# Patient Record
Sex: FEMALE | Race: BLACK OR AFRICAN AMERICAN | Employment: UNEMPLOYED | URBAN - METROPOLITAN AREA
[De-identification: names, ages, dates, MRNs, and addresses within clinical notes are randomized per-mention and may not be internally consistent; named-entity substitution may affect disease eponyms.]

---

## 2018-03-12 ENCOUNTER — HOSPITAL ENCOUNTER (OUTPATIENT)
Dept: OTHER | Age: 78
Discharge: HOME | End: 2018-03-31
Attending: ORTHOPAEDIC SURGERY | Admitting: ORTHOPAEDIC SURGERY

## 2018-03-12 ASSESSMENT — PAIN DESCRIPTION - ORIENTATION: ORIENTATION: RIGHT;LEFT

## 2018-03-12 ASSESSMENT — PAIN DESCRIPTION - DESCRIPTORS: DESCRIPTORS: ACHING;CONSTANT;SHOOTING;SHARP

## 2018-03-12 ASSESSMENT — PAIN DESCRIPTION - LOCATION: LOCATION: KNEE

## 2018-03-12 ASSESSMENT — PAIN DESCRIPTION - PROGRESSION: CLINICAL_PROGRESSION: GRADUALLY WORSENING

## 2018-03-22 ENCOUNTER — HOSPITAL ENCOUNTER (OUTPATIENT)
Dept: PHYSICAL THERAPY | Age: 78
Discharge: HOME | End: 2018-03-23
Admitting: ORTHOPAEDIC SURGERY

## 2018-03-22 NOTE — FLOWSHEET NOTE
10 Balance: Hip Abduction 10 SLS    Hip Circles  Tandem stance    TA set  NBOS eyes open 30 sec x 2   Glut Set  NBOS eyes closed    Hip Extension  Hand to Opposite Knee    Hip Adduction    Box Step     Hip IR   Noodle Stance     Hip ER  Stop/Go Gait    Fig 8's  Switch Gait      Placing foot on step 30 sec x 1  R/L         Seated: bilat Functional:    Ankle Pumps 10 Step up forward    Ankle circles 10 Step up lateral    Knee flex & ext 10 Step down    Hip Abd & Adduction 10 [place] squats    Bicycle  10 Crate Lifts    Add Set with ball 10 Lunges forward    LX stab with med ball throws  Lunges lateral    Ankle INV  Lunges retro    Ankle EV  Lower ab curl with noodle      Upper ab curl with ball      Med ball straight lifts      Med ball diagonal lifts      Hydrorider          Noodle:      Leg Press  Deep Water:    Noodle hang at wall  Jog    Noodle hang deep water  Jumping Jacks    Noodle Bicycle  Heel to toe      Hand to opposite knee      Cross country skier      Rocking Horse    Verbal cues for proper posture, exercise technique and exercise without increase pain. Timed Code Treatment Minutes:  30    Total Treatment Minutes:  30    Treatment/Activity Tolerance:  [x] Patient tolerated treatment well [] Patient limited by fatique  [] Patient limited by pain  [] Patient limited by other medical complications  [] Other:     Pain after aquatics: Left knee 4/10, Right knee 0/10    Patient Education:  Discussed with pt importance of exercises without increase pain and with proper posture. Pt verbalized understanding. Plan:   [x] Continue per plan of care [] Alter current plan (see comments)  [] Plan of care initiated [] Hold pending MD visit [] Discharge    Plan for Next Session:  Gradually increase gait and exercise with an emphasis on posture, proper exercise tech, and no increase pain. Therapist will educate patient on lumbopelvic stabilization with exercise and ADL's.       Add:  Inc seated exer x 15

## 2018-03-27 ENCOUNTER — HOSPITAL ENCOUNTER (OUTPATIENT)
Dept: PHYSICAL THERAPY | Age: 78
Discharge: HOME | End: 2018-03-28
Admitting: ORTHOPAEDIC SURGERY

## 2018-03-29 ENCOUNTER — HOSPITAL ENCOUNTER (OUTPATIENT)
Dept: PHYSICAL THERAPY | Age: 78
Discharge: HOME | End: 2018-03-30
Admitting: ORTHOPAEDIC SURGERY

## 2018-04-01 ENCOUNTER — HOSPITAL ENCOUNTER (OUTPATIENT)
Dept: OTHER | Age: 78
Discharge: HOME | End: 2018-04-30
Attending: ORTHOPAEDIC SURGERY | Admitting: ORTHOPAEDIC SURGERY

## 2018-04-03 ENCOUNTER — HOSPITAL ENCOUNTER (OUTPATIENT)
Dept: PHYSICAL THERAPY | Age: 78
Discharge: HOME | End: 2018-04-04
Admitting: ORTHOPAEDIC SURGERY

## 2018-04-05 ENCOUNTER — HOSPITAL ENCOUNTER (OUTPATIENT)
Dept: PHYSICAL THERAPY | Age: 78
Discharge: HOME | End: 2018-04-06
Admitting: ORTHOPAEDIC SURGERY

## 2018-04-10 ENCOUNTER — HOSPITAL ENCOUNTER (OUTPATIENT)
Dept: PHYSICAL THERAPY | Age: 78
Discharge: HOME | End: 2018-04-11
Admitting: ORTHOPAEDIC SURGERY

## 2018-04-10 NOTE — FLOWSHEET NOTE
R/L   Hip Circles 10 x 2 Tandem stance    TA set  NBOS eyes open    Glut Set 10 NBOS eyes closed    Hip Extension  Hand to Opposite Knee    Hip Adduction    Box Step     Hip IR   Noodle Stance     Hip ER  Stop/Go Gait    Fig 8's  Switch Gait      Placing foot on step      Alt step taps    Seated: bilat Functional:    Ankle Pumps 30 Step up forward 10 R/L without c/o   Ankle circles 10 Step up lateral    Knee flex & ext 30 Step down    Hip Abd & Adduction 30 [place] squats    Bicycle  30 Crate Lifts    Add Set with ball 10 Lunges forward    Knee ext with ball 10 R/L Lunges lateral    Ankle INV  Lunges retro    Ankle EV  Lower ab curl with noodle      Upper ab curl with ball      Med ball straight lifts      Med ball diagonal lifts      Hydrorider          Noodle:      Leg Press 20 R/L Deep Water:    Noodle hang at wall  Jog    Noodle hang deep water  Jumping Jacks    Noodle Bicycle  Heel to toe      Hand to opposite knee      Cross country skier      Rocking Horse    Verbal cues for proper posture, exercise technique and exercise without increase pain. Timed Code Treatment Minutes:  50    Total Treatment Minutes:  50    Treatment/Activity Tolerance:  [x] Patient tolerated treatment well [] Patient limited by fatique  [] Patient limited by pain  [] Patient limited by other medical complications  [] Other:     Pain after aquatics: 0/10 both knees    Patient Education:    Plan:   [x] Continue per plan of care [] Alter current plan (see comments)  [] Plan of care initiated [] Hold pending MD visit [] Discharge    Plan for Next Session:    Add: inc step ups fwd, inc forward gt as tolerated.     Electronically signed by: Alba Merida, [id number]

## 2018-04-10 NOTE — PROGRESS NOTES
Outpatient Physical Therapy  [x] [facility]    Phone: [phone]   Fax: [phone]   [] [facility]  Phone: [phone]   Fax: [phone]  [] [facility]              Phone: [phone]   Fax: [phone]     Physical Therapy Progress/Discharge Note  Date: 4/10/2018        Patient Name:  Buck Rosado    :  1940  MRN: [id number]    Diagnosis:  B knee pain  Treatment Diagnosis:  Decreased strength, mobility    Insurance/Certification information:  [address] of [facility]  Referring Physician:  Dayton Perez MD    Visit# / total visits:    Pain level: 5/10     G-Code (if applicable):      Date G-Code Applied:      PT G-Codes  Functional Assessment Tool Used: LEFS  Score: 80 = 21.3% confidence, 78.7% disability  Functional Limitation: Mobility: Walking and moving around  Mobility: Walking and Moving Around Current Status (): At least 60 percent but less than 80 percent impaired, limited or restricted  Mobility: Walking and Moving Around Goal Status (): At least 1 percent but less than 20 percent impaired, limited or restricted     Time Period for Report:  1 month  Cancels/No-shows to date: 1      Plan of Care/Treatment to date:  [x] Therapeutic Exercise    [] Modalities:  [x] Therapeutic Activity     [] Ultrasound  [] Electrical Stimulation  [] Gait Training      [] Cervical Traction    [] Lumbar Traction  [] Neuromuscular Re-education  [] Cold/hotpack [] Iontophoresis  [x] Instruction in HEP      Other:  [] Manual Therapy       []    [x] Aquatic Therapy       []                    ? Significant Findings At Last Visit/Comments:    Subjective:  Currently tolerating 45 mins of aquatic exercises without increased knee pain. Patient reports 50% improvement with pain overall. Patient notes she is up doing more around the house than prior to PT. Patient reports walking tolerance is about 5 mins before knees start hurting.        Objective:  · Observation: Updated

## 2018-04-12 ENCOUNTER — HOSPITAL ENCOUNTER (OUTPATIENT)
Dept: PHYSICAL THERAPY | Age: 78
Discharge: HOME | End: 2018-04-13
Admitting: ORTHOPAEDIC SURGERY

## 2018-04-17 ENCOUNTER — HOSPITAL ENCOUNTER (OUTPATIENT)
Dept: PHYSICAL THERAPY | Age: 78
Discharge: HOME | End: 2018-04-18
Admitting: ORTHOPAEDIC SURGERY

## 2018-04-17 NOTE — FLOWSHEET NOTE
Physical Therapy Aquatic Flow Sheet   Date:  2018    Patient Name:  Manjula Brasher    :  1940    Restrictions/Precautions:  Fall Risk (low: pt denies any history of falls.)  Pertinent Medical History: asthma, HTN    Diagnosis:  B knee pain  Treatment Diagnosis:  Decreased strength, mobility    Insurance/Certification information: [address] of [facility]    Referring Physician: Nikky Shipley MD  Plan of care signed (Y/N):      Visit# / total visits:    Pain level: 10 both knees    G-Code (if applicable):      Date G-Code Applied:        Progress Note: []  Yes  [x]  No  Next due by: Visit #10:      Subjective: I can walk longer using my cane without my knees hurting. Objective:  Observation:  See eval  Test measurements:      Key  B= Belt DB= Dumbells T= Theratube   G=Gloves N= Noodles W= Weights   P= Paddles WW=Water Walker K= Kickboard        Transfers:   steps with bilat handrails, step-to-step      % Immersion: 75%            Ambulation:   Exercises UE:      Forwards 5+2  Shoulder Shrugs     Lateral 3 Shoulder circles     Marching    Scapular Retraction      Retro   Rolling      Cariocas  Push Downs      IR/ER      Punching    Stretching: bilat Rowing    Gastroc/Soleus 20 sec x 3   Elbow Flex/Ext    Hamstring  20 sec x 3 Shldr Flex/Ext     Knee flex stretch   Shldr Abd/Add    Piriformis   Horiz Abd/Add     Hip flexor    Arm Circles     SKTC   PNF Diagonals    DKTC  UE oscillations f/b, s/s    ITB   Wall Push-ups    Quad  Figure 8's    Mid back   Buoy pull/push downs    UT  Tband rows    Rhom  Tband lats    Post Shoulder  Lumbar Rot w/ paddles    Pec   Small ball pull downs                   diagonals             Cervical:       AROM Flex       AROM Extension     LEs exercises:  bilat AROM Side Bending    Marching  10 AROM Rotation    Heel Raises  10 Chin tucks    Toe Raises  10 Chin nods     Squats  10      Hamstring Curls  10      Hip Flexion  10 Balance:      Hip Abduction 10

## 2018-04-19 ENCOUNTER — HOSPITAL ENCOUNTER (OUTPATIENT)
Dept: PHYSICAL THERAPY | Age: 78
Discharge: HOME | End: 2018-04-20
Admitting: ORTHOPAEDIC SURGERY

## 2018-04-19 NOTE — FLOWSHEET NOTE
Physical Therapy Aquatic Flow Sheet   Date:  2018    Patient Name:  Zach Norris    :  1940    Restrictions/Precautions:  Fall Risk (low: pt denies any history of falls.)  Pertinent Medical History: asthma, HTN    Diagnosis:  B knee pain  Treatment Diagnosis:  Decreased strength, mobility    Insurance/Certification information: [address]ah [address] of [facility]    Referring Physician: Shaista Escalera MD  Plan of care signed (Y/N):      Visit# / total visits:    Pain level: 1/10 both knees    G-Code (if applicable):      Date G-Code Applied:        Progress Note: []  Yes  [x]  No  Next due by: Visit #10:      Subjective: I was able to walk 2 blocks yesterday using my cane without any increase in pain or more pain today. My knees are very good.     Objective:  Observation:  See eval  Test measurements:      Key  B= Belt DB= Dumbells T= Theratube   G=Gloves N= Noodles W= Weights   P= Paddles WW=Water Walker K= Kickboard        Transfers:   steps with bilat handrails, step-to-step      % Immersion: 75%            Ambulation:   Exercises UE:      Forwards 5+2  Shoulder Shrugs     Lateral 4 Shoulder circles     Marching    Scapular Retraction      Retro   Rolling      Cariocas  Push Downs      IR/ER      Punching    Stretching: bilat Rowing    Gastroc/Soleus 20 sec x 3   Elbow Flex/Ext    Hamstring  20 sec x 3 Shldr Flex/Ext     Knee flex stretch   Shldr Abd/Add    Piriformis   Horiz Abd/Add     Hip flexor    Arm Circles     SKTC   PNF Diagonals    DKTC  UE oscillations f/b, s/s    ITB   Wall Push-ups    Quad  Figure 8's    Mid back   Buoy pull/push downs    UT  Tband rows    Rhom  Tband lats    Post Shoulder  Lumbar Rot w/ paddles    Pec   Small ball pull downs                   diagonals             Cervical:       AROM Flex       AROM Extension     LEs exercises:  bilat AROM Side Bending    Marching  10 AROM Rotation    Heel Raises  10 Chin tucks    Toe Raises  10 Chin nods     Squats  10

## 2018-04-24 ENCOUNTER — HOSPITAL ENCOUNTER (OUTPATIENT)
Dept: PHYSICAL THERAPY | Age: 78
Discharge: HOME | End: 2018-04-25
Admitting: ORTHOPAEDIC SURGERY

## 2018-04-26 ENCOUNTER — HOSPITAL ENCOUNTER (OUTPATIENT)
Dept: PHYSICAL THERAPY | Age: 78
Discharge: HOME | End: 2018-04-27
Admitting: ORTHOPAEDIC SURGERY

## 2018-05-01 ENCOUNTER — HOSPITAL ENCOUNTER (OUTPATIENT)
Dept: PHYSICAL THERAPY | Age: 78
Discharge: HOME | End: 2018-05-02
Admitting: ORTHOPAEDIC SURGERY

## 2018-05-01 ENCOUNTER — HOSPITAL ENCOUNTER (OUTPATIENT)
Dept: OTHER | Age: 78
Discharge: HOME | End: 2018-05-31
Attending: ORTHOPAEDIC SURGERY | Admitting: ORTHOPAEDIC SURGERY

## 2018-05-08 ENCOUNTER — HOSPITAL ENCOUNTER (OUTPATIENT)
Dept: PHYSICAL THERAPY | Age: 78
Discharge: HOME | End: 2018-05-09
Admitting: ORTHOPAEDIC SURGERY

## 2018-05-10 ENCOUNTER — HOSPITAL ENCOUNTER (OUTPATIENT)
Dept: PHYSICAL THERAPY | Age: 78
Discharge: HOME | End: 2018-05-11
Admitting: ORTHOPAEDIC SURGERY

## 2018-05-10 NOTE — FLOWSHEET NOTE
Physical Therapy  Cancellation/No-show Note  Patient Name:  Manjula Brasher  :  1940   Date:  5/10/2018  Cancelled visits to date: 3  No-shows to date: 0    For today's appointment patient:  [x]  Cancelled  []  Rescheduled appointment  []  No-show     Reason given by patient:  [x]  Patient ill  []  Conflicting appointment  []  No transportation    []  Conflict with work  []  No reason given  []  Other:     Comments:      Electronically signed by:  John Ruby

## 2018-05-15 ENCOUNTER — HOSPITAL ENCOUNTER (OUTPATIENT)
Dept: PHYSICAL THERAPY | Age: 78
Discharge: HOME | End: 2018-05-16
Admitting: ORTHOPAEDIC SURGERY

## 2018-06-01 ENCOUNTER — HOSPITAL ENCOUNTER (OUTPATIENT)
Dept: OTHER | Age: 78
Discharge: HOME | End: 2018-06-26
Attending: ORTHOPAEDIC SURGERY | Admitting: ORTHOPAEDIC SURGERY

## 2018-12-23 ENCOUNTER — APPOINTMENT (OUTPATIENT)
Dept: GENERAL RADIOLOGY | Age: 78
End: 2018-12-23
Payer: MEDICARE

## 2018-12-23 ENCOUNTER — HOSPITAL ENCOUNTER (EMERGENCY)
Age: 78
Discharge: HOME | End: 2018-12-23
Payer: MEDICARE

## 2018-12-23 VITALS
SYSTOLIC BLOOD PRESSURE: 142 MMHG | HEART RATE: 76 BPM | HEIGHT: 64 IN | TEMPERATURE: 98.4 F | RESPIRATION RATE: 15 BRPM | DIASTOLIC BLOOD PRESSURE: 76 MMHG | BODY MASS INDEX: 28.68 KG/M2 | OXYGEN SATURATION: 96 % | WEIGHT: 167.99 LBS

## 2018-12-23 DIAGNOSIS — M10.9 ACUTE GOUT OF RIGHT HAND, UNSPECIFIED CAUSE: Primary | ICD-10-CM

## 2018-12-23 DIAGNOSIS — M18.11 DEGENERATIVE ARTHRITIS OF THUMB, RIGHT: ICD-10-CM

## 2018-12-23 LAB
A/G RATIO: 1.2 (ref 1.1–2.2)
ALBUMIN SERPL-MCNC: 4 G/DL (ref 3.4–5)
ALP BLD-CCNC: 83 U/L (ref 40–129)
ALT SERPL-CCNC: 13 U/L (ref 10–40)
ANION GAP SERPL CALCULATED.3IONS-SCNC: 15 MMOL/L (ref 3–16)
AST SERPL-CCNC: 19 U/L (ref 15–37)
BASOPHILS ABSOLUTE: 0.1 K/UL (ref 0–0.2)
BASOPHILS RELATIVE PERCENT: 0.6 %
BILIRUB SERPL-MCNC: 0.4 MG/DL (ref 0–1)
BUN BLDV-MCNC: 13 MG/DL (ref 7–20)
CALCIUM SERPL-MCNC: 9.5 MG/DL (ref 8.3–10.6)
CHLORIDE BLD-SCNC: 101 MMOL/L (ref 99–110)
CO2: 24 MMOL/L (ref 21–32)
CREAT SERPL-MCNC: 0.6 MG/DL (ref 0.6–1.2)
EOSINOPHILS ABSOLUTE: 0.1 K/UL (ref 0–0.6)
EOSINOPHILS RELATIVE PERCENT: 1.5 %
GFR AFRICAN AMERICAN: >60
GFR NON-AFRICAN AMERICAN: >60
GLOBULIN: 3.4 G/DL
GLUCOSE BLD-MCNC: 96 MG/DL (ref 70–99)
HCT VFR BLD CALC: 40.3 % (ref 36–48)
HEMOGLOBIN: 13.6 G/DL (ref 12–16)
LYMPHOCYTES ABSOLUTE: 3.5 K/UL (ref 1–5.1)
LYMPHOCYTES RELATIVE PERCENT: 36.6 %
MCH RBC QN AUTO: 29.6 PG (ref 26–34)
MCHC RBC AUTO-ENTMCNC: 33.8 G/DL (ref 31–36)
MCV RBC AUTO: 87.5 FL (ref 80–100)
MONOCYTES ABSOLUTE: 0.6 K/UL (ref 0–1.3)
MONOCYTES RELATIVE PERCENT: 6.8 %
NEUTROPHILS ABSOLUTE: 5.2 K/UL (ref 1.7–7.7)
NEUTROPHILS RELATIVE PERCENT: 54.5 %
PDW BLD-RTO: 14.8 % (ref 12.4–15.4)
PLATELET # BLD: 318 K/UL (ref 135–450)
PMV BLD AUTO: 7.4 FL (ref 5–10.5)
POTASSIUM SERPL-SCNC: 3 MMOL/L (ref 3.5–5.1)
RBC # BLD: 4.6 M/UL (ref 4–5.2)
RHEUMATOID FACTOR: <10 IU/ML
SEDIMENTATION RATE, ERYTHROCYTE: 18 MM/HR (ref 0–30)
SODIUM BLD-SCNC: 140 MMOL/L (ref 136–145)
TOTAL PROTEIN: 7.4 G/DL (ref 6.4–8.2)
URIC ACID, SERUM: 6.4 MG/DL (ref 2.6–6)
WBC # BLD: 9.5 K/UL (ref 4–11)

## 2018-12-23 PROCEDURE — 73140 X-RAY EXAM OF FINGER(S): CPT

## 2018-12-23 PROCEDURE — 6370000000 HC RX 637 (ALT 250 FOR IP): Performed by: PHYSICIAN ASSISTANT

## 2018-12-23 PROCEDURE — 80053 COMPREHEN METABOLIC PANEL: CPT

## 2018-12-23 PROCEDURE — 85025 COMPLETE CBC W/AUTO DIFF WBC: CPT

## 2018-12-23 PROCEDURE — 84550 ASSAY OF BLOOD/URIC ACID: CPT

## 2018-12-23 PROCEDURE — 85652 RBC SED RATE AUTOMATED: CPT

## 2018-12-23 PROCEDURE — 86431 RHEUMATOID FACTOR QUANT: CPT

## 2018-12-23 PROCEDURE — 36415 COLL VENOUS BLD VENIPUNCTURE: CPT

## 2018-12-23 PROCEDURE — 99283 EMERGENCY DEPT VISIT LOW MDM: CPT

## 2018-12-23 RX ORDER — OXYCODONE HYDROCHLORIDE AND ACETAMINOPHEN 5; 325 MG/1; MG/1
1 TABLET ORAL ONCE
Status: COMPLETED | OUTPATIENT
Start: 2018-12-23 | End: 2018-12-23

## 2018-12-23 RX ORDER — HYDROCODONE BITARTRATE AND ACETAMINOPHEN 5; 325 MG/1; MG/1
1 TABLET ORAL EVERY 6 HOURS PRN
COMMUNITY

## 2018-12-23 RX ORDER — PREDNISONE 10 MG/1
20 TABLET ORAL DAILY
Qty: 10 TABLET | Refills: 0 | Status: SHIPPED | OUTPATIENT
Start: 2018-12-23 | End: 2018-12-28

## 2018-12-23 RX ORDER — OXYCODONE HYDROCHLORIDE AND ACETAMINOPHEN 5; 325 MG/1; MG/1
1 TABLET ORAL EVERY 8 HOURS PRN
Qty: 6 TABLET | Refills: 0 | Status: SHIPPED | OUTPATIENT
Start: 2018-12-23 | End: 2018-12-26

## 2018-12-23 RX ADMIN — OXYCODONE AND ACETAMINOPHEN 1 TABLET: 5; 325 TABLET ORAL at 14:37

## 2018-12-23 ASSESSMENT — PAIN SCALES - GENERAL
PAINLEVEL_OUTOF10: 10
PAINLEVEL_OUTOF10: 6
PAINLEVEL_OUTOF10: 7
PAINLEVEL_OUTOF10: 10

## 2018-12-23 ASSESSMENT — PAIN DESCRIPTION - LOCATION: LOCATION: FINGER (COMMENT WHICH ONE)

## 2018-12-23 ASSESSMENT — PAIN DESCRIPTION - PAIN TYPE
TYPE: ACUTE PAIN
TYPE: ACUTE PAIN

## 2018-12-23 ASSESSMENT — PAIN DESCRIPTION - ORIENTATION: ORIENTATION: RIGHT

## 2021-04-19 NOTE — ED PROVIDER NOTES
ROUNDS MADE. PT LYING IN SEMIFOWLERS AA&O. REPORTS SHE HAS JUST RETURNED FROM
THE BATHROOM. PT REPORTS INCREASED PAIN 10/10 IN HER LOWER ABD AND NOW REPORTS
SHE FEELS SHE IS SOB AND FEELS IT IN HER RT SIDE. PAIN MEDICATION OFFERED AND
GIVEN. SEE EMAR. PT DENIES NEEDS AT THIS TIME. REPORTS SHE WAS ABLE TO EAT "A
LITTLE" OF A REG LUNCH SERVED. BED LOW, SIDE RAILS UP X 2. CALL LIGHT AND
PHONE AT PT'S SIDE. VITAL SIGNS OBTAINED W/THIS ROUNDING. SEE FLOWSHEET. Performed at:  [facility]  [address], [place]   Phone [phone]   URIC ACID - Abnormal; Notable for the following:     Uric Acid, Serum 6.4 (*)     All other components within normal limits    Narrative:     Performed at:  [facility]  [address], [place]   Phone [phone]   CBC WITH AUTO DIFFERENTIAL    Narrative:     Performed at:  [facility]  [address], [place] [facility] [place]   Phone [phone]   SEDIMENTATION RATE    Narrative:     Performed at:  [facility]  [address], [place] [facility]   Phone [phone]   RHEUMATOID FACTOR    Narrative:     Performed at:  [facility]  [address] [facility] [address]   Phone [phone] of labs reviewed and werenegative at this time or not returned at the time of this note. RADIOLOGY:   Non-plain film images such as CT, Ultrasound and MRI are read by the radiologist. Alvina Castro PA-C have directly visualized the radiologic plain film image(s) with the below findings:    X-ray shows degenerative change without acute osseous abnormality. Interpretation per the Radiologist below, if available at the time of thisnote:    XR FINGER RIGHT (MIN 2 VIEWS)   Final Result   Moderate degenerative changes of osteoarthritis with no acute finding. No results found. MEDICAL DECISION MAKING / ED COURSE:      PROCEDURES:   Procedures    None    Patient was given:  Medications   oxyCODONE-acetaminophen (PERCOCET) 5-325 MG per tablet 1 tablet (1 tablet Oral Given 12/23/18 1270)       Patient presenting with 1 addition progressive pain and subsequent swelling of the, right thumb. She has not had pain similar occurrence.   No history of diabetes, rheumatoid or gout. Laboratory evaluation reveals a WBC of 9.5 and hemoglobin 13.6. Do not suspect an acute infectious process. Sed rate is not elevated. BNP shows normal renal and hepatic function. The patient's uric acid is a bit elevated at 6.4. I will assume this to be gallops. She is placed in a splint. I did obtain a rheumatoid factor which resulted at less than 10 and this is not returned arthritis. I do believe this is more likely an inflammatory arthropathy secondary to elevated uric acid potentially gout. She is placed in a splint. In the emergency room she is given Percocet 5 mg #1. She is discharged with prednisone and Percocet. I did review a steak reported. I find it concerning findings. Elevation and ice recommended. The patient does express understanding of her diagnosis and treatment plan. The patient tolerated their visit well. I evaluated the patient. The physician was available for consultation as needed. The patient and / or the family were informed of the results of anytests, a time was given to answer questions, a plan was proposed and they agreed with plan. CLINICAL IMPRESSION:  1. Acute gout of right hand, unspecified cause - right thumb    2. Degenerative arthritis of thumb, right        DISPOSITION Decision To Discharge 12/23/2018 03:51:54 PM      PATIENT REFERRED TO:  Jair Joyner  [address] [place] [address],[address] [place] [address] [address] [facility]    Schedule an appointment as soon as possible for a visit       [facility] [address]  [phone]  Go to   If symptoms worsen      DISCHARGE MEDICATIONS:  Discharge Medication List as of 12/23/2018  3:58 PM      START taking these medications    Details   oxyCODONE-acetaminophen (PERCOCET) 5-325 MG per tablet Take 1 tablet by mouth every 8 hours as needed for Pain for up to 3 days. WARNING:  May cause drowsiness. May impair ability to operate vehicles or machinery.